# Patient Record
(demographics unavailable — no encounter records)

---

## 2024-10-21 NOTE — HISTORY OF PRESENT ILLNESS
[Y] : Positive pregnancy history [___] : #2 ([unfilled]): [Pregnancy History] :  delivery [Menarche Age: ____] : age at menarche was [unfilled] [N] : Patient is not sexually active [No] : Patient does not have concerns regarding sex [Previously active] : previously active [Men] : men [FreeTextEntry1] : Pt is a 39 yo  with h/o of h/o endometriosis/ adenomyosis (2019) here for follow-up and scheduling surgery for AUB/dysmenorrhea/endometriosis. Seen previously by me at [facility]. Most recently seen by PA at [facility] with c/o   L nipple discharge, L breast lump nodule, benign (2024), saw breast specialist and plan for follow-up imaging for stability. States occasional L breast pain.   Pt feels well on MyFembree with amenorrhea and less pain but would prefer to have surgery and be able to stop taking MyFembree.   MRI 2023: arcuate uterus mildly enlarged 9.5 x 5.3 x 7 cm.  scar. No discrete myometrial mass ENDOMETRIUM: Thickened measuring up to 1.5 cm JUNCTIONAL ZONE: Areas of thickening and tiny subendometrial cysts in keeping with adenomyosis  RIGHT OVARY: Contains a 1.8 cm likely dominant follicle LEFT OVARY: Within normal limits ADNEXA: Within normal limits.  BLADDER: Within normal limits. Bilateral ureteral jets are visualized.  LYMPH NODES: No pelvic lymphadenopathy.  VISUALIZED PORTIONS: No hydronephrosis BOWEL: Unobstructed bowel Mild sigmoid colonic diverticulosis PERITONEUM: Trace volume pelvic fluid, within physiologic limits. VESSELS: Within normal limits. ABDOMINAL WALL: Pfannenstiel scar. Small fat-containing right inguinal hernia BONES: Hematopoietic bone marrow signal  IMPRESSION: Uterine adenomyosis     LMP unk on MyFembree Lpap-2023 Lhpv-2023 Lmammo- (24) negative, BIRAS1, f/u one year Lbreast sonogram- (24) L breast nodule with benign features. 6 month f/u  Lcolonoscopy-    PObHx: +NSVDx1, +C/s x 1, +IUIx3  PGynHx: +endometriosis/ adenomyosis   PMH: Denies PSH:  d&c (2023) laparoscopy (, ) ablation of endo FamHx: -endometrosis, sister (had hysterectomy)  - Heart disease, Maternal and paternal grandparents  SocialHx: denies smoking, denies drug use, Medications: myfembree Allergies: NKDA               [PapSmeardate] : 10/2023 [TextBox_31] : WNL as per patient  [LMPDate] : 11/2023 [PGHxTotal] : 2 [[facility] 3 [PGHxMultBirths] : 1

## 2024-10-21 NOTE — HISTORY OF PRESENT ILLNESS
[Y] : Positive pregnancy history [___] : #2 ([unfilled]): [Pregnancy History] :  delivery [Menarche Age: ____] : age at menarche was [unfilled] [N] : Patient is not sexually active [No] : Patient does not have concerns regarding sex [Previously active] : previously active [Men] : men [FreeTextEntry1] : Pt is a 39 yo  with h/o of h/o endometriosis/ adenomyosis (2019) here for follow-up and scheduling surgery for AUB/dysmenorrhea/endometriosis. Seen previously by me at [facility]. Most recently seen by PA at [facility] with c/o   L nipple discharge, L breast lump nodule, benign (2024), saw breast specialist and plan for follow-up imaging for stability. States occasional L breast pain.   Pt feels well on MyFembree with amenorrhea and less pain but would prefer to have surgery and be able to stop taking MyFembree.   MRI 2023: arcuate uterus mildly enlarged 9.5 x 5.3 x 7 cm.  scar. No discrete myometrial mass ENDOMETRIUM: Thickened measuring up to 1.5 cm JUNCTIONAL ZONE: Areas of thickening and tiny subendometrial cysts in keeping with adenomyosis  RIGHT OVARY: Contains a 1.8 cm likely dominant follicle LEFT OVARY: Within normal limits ADNEXA: Within normal limits.  BLADDER: Within normal limits. Bilateral ureteral jets are visualized.  LYMPH NODES: No pelvic lymphadenopathy.  VISUALIZED PORTIONS: No hydronephrosis BOWEL: Unobstructed bowel Mild sigmoid colonic diverticulosis PERITONEUM: Trace volume pelvic fluid, within physiologic limits. VESSELS: Within normal limits. ABDOMINAL WALL: Pfannenstiel scar. Small fat-containing right inguinal hernia BONES: Hematopoietic bone marrow signal  IMPRESSION: Uterine adenomyosis     LMP unk on MyFembree Lpap-2023 Lhpv-2023 Lmammo- (24) negative, BIRAS1, f/u one year Lbreast sonogram- (24) L breast nodule with benign features. 6 month f/u  Lcolonoscopy-    PObHx: +NSVDx1, +C/s x 1, +IUIx3  PGynHx: +endometriosis/ adenomyosis   PMH: Denies PSH:  d&c (2023) laparoscopy (, ) ablation of endo FamHx: -endometrosis, sister (had hysterectomy)  - Heart disease, Maternal and paternal grandparents  SocialHx: denies smoking, denies drug use, Medications: myfembree Allergies: NKDA               [PapSmeardate] : 10/2023 [TextBox_31] : WNL as per patient  [LMPDate] : 11/2023 [PGHxTotal] : 2 [[facility]] : 3 [PGHxMultBirths] : 1

## 2024-10-21 NOTE — DISCUSSION/SUMMARY
[FreeTextEntry1] : 39 yo with AUB, dysmenorrhea, dyspareunia, hx of endometriosis with symptoms improved on MyFembree. s/p EMBx benign.  -Pelvic sono for evaluation prior to surgery -Discussed options for management including continuing MyFembree, conservative surgery with diagnostic laparoscopy and excision of endometriosis present, or hysterectomy with excision of endometriosis present. Discussed findings of adenomyosis on MRI. Pt would like to proceed with definitive management with hysterectomy. Based on her exam, she is a candidate for minimally invasive approach.  -Plan for pelvic EUA, robot-assisted laparoscopic hysterectomy, bilateral salpingectomy, excision of endometriosis, cystoscopy, all indicated procedures -Continue MyFembree until surgery -She will be contacted with a surgical date -Does not require medical clearance -Discussed chronic nature of endometriosis and recommendation for postoperative medical management of endometriosis to reduce risk of recurrence, will continue this discussion after surgery  -All questions answered to her satisfaction  I spent 62 minutes of total time on the day of the encounter preparing for the visit, review of records, interacting with the patient, EHR documentation, and coordinating care.

## 2025-01-06 NOTE — DISCUSSION/SUMMARY
[FreeTextEntry1] : 41 yo with AUB, dysmenorrhea, dyspareunia, hx of endometriosis with symptoms improved on MyFembree. s/p EMBx benign.  -pelvic EUA, robot-assisted laparoscopic hysterectomy, bilateral salpingectomy, excision of endometriosis, cystoscopy with possible ICG injection, all indicated procedures at [facility] on 1/8/25 -Continue MyFembree until surgery -s/p PST -Discussed chronic nature of endometriosis and recommendation for postoperative medical management of endometriosis to reduce risk of recurrence, will continue this discussion after surgery  -All questions answered to her satisfaction  I spent 40 minutes of total time on the day of the encounter preparing for the visit, review of records, interacting with the patient, EHR documentation, and coordinating care.

## 2025-01-06 NOTE — DISCUSSION/SUMMARY
[FreeTextEntry1] : 41 yo with AUB, dysmenorrhea, dyspareunia, hx of endometriosis with symptoms improved on MyFembree. s/p EMBx benign.  -pelvic EUA, robot-assisted laparoscopic hysterectomy, bilateral salpingectomy, excision of endometriosis, cystoscopy with possible ICG injection, all indicated procedures at [facility] on 1/8/25 -Continue MyFembree until surgery -s/p PST -Discussed chronic nature of endometriosis and recommendation for postoperative medical management of endometriosis to reduce risk of recurrence, will continue this discussion after surgery  -All questions answered to her satisfaction  I spent 40 minutes of total time on the day of the encounter preparing for the visit, review of records, interacting with the patient, EHR documentation, and coordinating care.

## 2025-01-06 NOTE — HISTORY OF PRESENT ILLNESS
[FreeTextEntry1] :  39 yo with AUB, dysmenorrhea, dyspareunia, hx of endometriosis with symptoms improved on MyFembree. s/p EMBx benign here today for pre op appointment.  C/S x 1 (twins) NSVDx1, diagnostic laparoscopy x 2.   pt is scheduled for pelvic EUA, robot-assisted laparoscopic hysterectomy, bilateral salpingectomy, excision of endometriosis, cystoscopy with possible ICG injection, all indicated procedures at [facility] on 1/8/25  Medical clearance- Not required PST- 12/20/24  Last seen in office on 10/21/24, Discussed options for management including continuing MyFembree, conservative surgery with diagnostic laparoscopy and excision of endometriosis present, or hysterectomy with excision of endometriosis present. Discussed findings of adenomyosis on MRI. Pt would like to proceed with definitive management with hysterectomy. Based on her exam, she is a candidate for minimally invasive approach. Plan for pelvic EUA, robot-assisted laparoscopic hysterectomy, bilateral salpingectomy, excision of endometriosis, cystoscopy, all indicated procedures. Discussed Continue MyFembree until surgery. Discussed chronic nature of endometriosis and recommendation for postoperative medical management of endometriosis to reduce risk of recurrence, will continue this discussion after surgery  Discussed surgical plan for pelvic exam under anesthesia, robot-assisted laparoscopic hysterectomy, bilateral salpingectomy, excision of endometriosis, cystoscopy with possible ICG injection, all indicated procedures. Discussed risks to include, but are not limited to, bleeding, possible transfusion, infection, fistula, damage to nearby organs, vessels, or nerves resulting in temporary or permanent injury, deep venous thrombosis, and perioperative death. Discussed risk of laparotomy if unable to have adequate visualization to complete using minimally invasive approach. Discussed the possible need for contained extraction of the uterus in a bag in unable to remove vaginally. We reviewed the planned location of the incisions and I showed them to her on her abdomen.  She also understands the limitations of laparoscopic surgery and the possibility of missing a surgical complication with need for subsequent re-exploration. She also understands the rationale for a cystoscopy at the completion of the procedure and the potential risks of cystoscopy.   Postoperative pain management was reviewed with plan for alternating Tylenol and Motrin with Oxycodone for breakthrough pain. Discussed the benefit of ice packs x first 24 hours.  Discussed postoperative expectations and restrictions.   We reviewed that there will be physician assistants and nurse practitioners in the operating room who may assist in the pelvic exam for learning purposes and who will be assisting in the surgery.   She agrees to proceed. All of her questions were answered to her satisfaction.

## 2025-01-06 NOTE — HISTORY OF PRESENT ILLNESS
[FreeTextEntry1] :  39 yo with AUB, dysmenorrhea, dyspareunia, hx of endometriosis with symptoms improved on MyFembree. s/p EMBx benign here today for pre op appointment.  C/S x 1 (twins) NSVDx1, diagnostic laparoscopy x 2.   pt is scheduled for pelvic EUA, robot-assisted laparoscopic hysterectomy, bilateral salpingectomy, excision of endometriosis, cystoscopy with possible ICG injection, all indicated procedures at [facility] on 1/8/25  Medical clearance- Not required PST- 12/20/24  Last seen in office on 10/21/24, Discussed options for management including continuing MyFembree, conservative surgery with diagnostic laparoscopy and excision of endometriosis present, or hysterectomy with excision of endometriosis present. Discussed findings of adenomyosis on MRI. Pt would like to proceed with definitive management with hysterectomy. Based on her exam, she is a candidate for minimally invasive approach. Plan for pelvic EUA, robot-assisted laparoscopic hysterectomy, bilateral salpingectomy, excision of endometriosis, cystoscopy, all indicated procedures. Discussed Continue MyFembree until surgery. Discussed chronic nature of endometriosis and recommendation for postoperative medical management of endometriosis to reduce risk of recurrence, will continue this discussion after surgery  Discussed surgical plan for pelvic exam under anesthesia, robot-assisted laparoscopic hysterectomy, bilateral salpingectomy, excision of endometriosis, cystoscopy with possible ICG injection, all indicated procedures. Discussed risks to include, but are not limited to, bleeding, possible transfusion, infection, fistula, damage to nearby organs, vessels, or nerves resulting in temporary or permanent injury, deep venous thrombosis, and perioperative death. Discussed risk of laparotomy if unable to have adequate visualization to complete using minimally invasive approach. Discussed the possible need for contained extraction of the uterus in a bag in unable to remove vaginally. We reviewed the planned location of the incisions and I showed them to her on her abdomen.  She also understands the limitations of laparoscopic surgery and the possibility of missing a surgical complication with need for subsequent re-exploration. She also understands the rationale for a cystoscopy at the completion of the procedure and the potential risks of cystoscopy.   Postoperative pain management was reviewed with plan for alternating Tylenol and Motrin with Oxycodone for breakthrough pain. Discussed the benefit of ice packs x first 24 hours.  Discussed postoperative expectations and restrictions.   We reviewed that there will be physician assistants and nurse practitioners in the operating room who may assist in the pelvic exam for learning purposes and who will be assisting in the surgery.   She agrees to proceed. All of her questions were answered to her satisfaction.

## 2025-01-27 NOTE — DISCUSSION/SUMMARY
[FreeTextEntry1] : 39 yo s/p  EUA, RA TLH, BS, FLORENCIO, Excision of endometriosis, cystoscopy on 1/8/25 at [facility] doing well postoperatively.  -Postoperative restrictions/expectations reviewed, no lifting/exercise until 6 wks postop -No intercourse until 12 wks postop -Intraoperative and pathology findings reviewed -Given no findings of endometriosis, no indication to continue hormonal management -All questions answered -Resume usual GYN care

## 2025-01-27 NOTE — PHYSICAL EXAM
[Chaperone Present] : A chaperone was present in the examining room during all aspects of the physical examination [29032] : A chaperone was present during the pelvic exam. [FreeTextEntry2] :  PAOLA Piersonuez  [Appropriately responsive] : appropriately responsive [Alert] : alert [Soft] : soft [Non-tender] : non-tender [Non-distended] : non-distended [No Lesions] : no lesions [No Mass] : no mass [FreeTextEntry7] : incisions c/d/i, mild erythema around incisions, not tender/warm [Labia Majora] : normal [Labia Minora] : normal [Normal] : normal [Absent] : absent [Uterine Adnexae] : normal [FreeTextEntry4] : cuff intact [FreeTextEntry8] : no uterosacral nodularity palpated

## 2025-01-27 NOTE — REASON FOR VISIT
[Post-Op Visit] : a post-op visit for [FreeTextEntry2] : EUA, TIO TLH, BS, FLORENCIO, Excision of endometriosis, cystoscopy.

## 2025-01-27 NOTE — PHYSICAL EXAM
[Chaperone Present] : A chaperone was present in the examining room during all aspects of the physical examination [66051] : A chaperone was present during the pelvic exam. [FreeTextEntry2] :  PAOLA Piersonuez  [Appropriately responsive] : appropriately responsive [Alert] : alert [Soft] : soft [Non-tender] : non-tender [Non-distended] : non-distended [No Lesions] : no lesions [No Mass] : no mass [FreeTextEntry7] : incisions c/d/i, mild erythema around incisions, not tender/warm [Labia Majora] : normal [Labia Minora] : normal [Normal] : normal [Absent] : absent [Uterine Adnexae] : normal [FreeTextEntry4] : cuff intact [FreeTextEntry8] : no uterosacral nodularity palpated

## 2025-01-27 NOTE — HISTORY OF PRESENT ILLNESS
[FreeTextEntry1] :     Pt is a 39 yo s/p  EUA, RA TLH, BS, FLORENCIO, Excision of endometriosis, cystoscopy on 1/8/25 at [facility]. Indication: Dysmenorrhea, Dyspareunia, hx of endometriosis.    Pt is 2W5D post op, presents here today in office for a post-op evaluation and review pathology. She is recovering well from her surgery. Pain well-controlled, tolerating regular diet, ambulating, voiding, normal BM. Denies vaginal bleeding. States had some irritation/itching around incisions from dermabond, improving now that glue is off.    Intraop Findings: Pelvic exam under anesthesia: small anteverted mobile uterus, thickened area palpated anteriorly, no adnexal masses. Intraoperative findings: normal-appearing uterus, bilateral tubes and ovaries. Normal upper abdomen including appendix. Fibrotic appearing lesions noted on sigmoid mesentery, left lower abdominal and pelvic sidewall and left uterosacral ligament. Dense bladder adhesions to lower uterine segment and cervix. Cystoscopy: normal-appearing bladder, brisk bilateral ureteral jets   Pathology Surgical Pathology Report Diagnosis A. Sigmoid mesentery implant versus adhesion, excision: *   Peritoneal tissue with fibrosis, negative for endometriosis.  B. Left lower abdominal wall implant, excision: *   Peritoneal tissue with fibrosis, negative for endometriosis.  C. Left pelvic sidewall implant, excision:  *   Peritoneal tissue with chronic inflammation, negative for endometriosis.  D. Left uterosacral ligament implant, excision: *   Peritoneal tissue with fibrosis, negative for endometriosis.  E. Left pelvic brim implant, excision: *   Peritoneal tissue with fibrosis, negative for endometriosis.  F. Uterus, cervix, bilateral fallopian tubes, hysterectomy and bilateral salpingectomy: Cervix: *  No significant pathologic diagnosis. Endocervix: *  Chronic endocervicitis. Endometrium: *  Inactive endometrium. Myometrium: *  Adenomyosis. Fallopian tubes: *  Fallopian tube with no significant pathologic diagnoses.  Intraoperative and pathology findings reviewed, discussed no pathology finding of endometriosis which is consistent with appearance intraoperatively. We reviewed adenomyosis which is cause of her symptoms for which hysterectomy is curative.

## 2025-01-27 NOTE — HISTORY OF PRESENT ILLNESS
[FreeTextEntry1] :     Pt is a 39 yo s/p  EUA, RA TLH, BS, FLORENCIO, Excision of endometriosis, cystoscopy on 1/8/25 at [facility]. Indication: Dysmenorrhea, Dyspareunia, hx of endometriosis.    Pt is 2W5D post op, presents here today in office for a post-op evaluation and review pathology. She is recovering well from her surgery. Pain well-controlled, tolerating regular diet, ambulating, voiding, normal BM. Denies vaginal bleeding. States had some irritation/itching around incisions from dermabond, improving now that glue is off.    Intraop Findings: Pelvic exam under anesthesia: small anteverted mobile uterus, thickened area palpated anteriorly, no adnexal masses. Intraoperative findings: normal-appearing uterus, bilateral tubes and ovaries. Normal upper abdomen including appendix. Fibrotic appearing lesions noted on sigmoid mesentery, left lower abdominal and pelvic sidewall and left uterosacral ligament. Dense bladder adhesions to lower uterine segment and cervix. Cystoscopy: normal-appearing bladder, brisk bilateral ureteral jets   Pathology Surgical Pathology Report Diagnosis A. Sigmoid mesentery implant versus adhesion, excision: *   Peritoneal tissue with fibrosis, negative for endometriosis.  B. Left lower abdominal wall implant, excision: *   Peritoneal tissue with fibrosis, negative for endometriosis.  C. Left pelvic sidewall implant, excision:  *   Peritoneal tissue with chronic inflammation, negative for endometriosis.  D. Left uterosacral ligament implant, excision: *   Peritoneal tissue with fibrosis, negative for endometriosis.  E. Left pelvic brim implant, excision: *   Peritoneal tissue with fibrosis, negative for endometriosis.  F. Uterus, cervix, bilateral fallopian tubes, hysterectomy and bilateral salpingectomy: Cervix: *  No significant pathologic diagnosis. Endocervix: *  Chronic endocervicitis. Endometrium: *  Inactive endometrium. Myometrium: *  Adenomyosis. Fallopian tubes: *  Fallopian tube with no significant pathologic diagnoses.  Intraoperative and pathology findings reviewed, discussed no pathology finding of endometriosis which is consistent with appearance intraoperatively. We reviewed adenomyosis which is cause of her symptoms for which hysterectomy is curative.